# Patient Record
Sex: MALE | Race: WHITE | URBAN - METROPOLITAN AREA
[De-identification: names, ages, dates, MRNs, and addresses within clinical notes are randomized per-mention and may not be internally consistent; named-entity substitution may affect disease eponyms.]

---

## 2019-09-05 ENCOUNTER — OFFICE VISIT (OUTPATIENT)
Dept: INTERNAL MEDICINE | Facility: CLINIC | Age: 34
End: 2019-09-05
Payer: COMMERCIAL

## 2019-09-05 VITALS
HEART RATE: 66 BPM | OXYGEN SATURATION: 98 % | SYSTOLIC BLOOD PRESSURE: 120 MMHG | BODY MASS INDEX: 25.76 KG/M2 | TEMPERATURE: 98 F | HEIGHT: 71 IN | RESPIRATION RATE: 16 BRPM | WEIGHT: 184 LBS | DIASTOLIC BLOOD PRESSURE: 68 MMHG

## 2019-09-05 DIAGNOSIS — F90.9 ATTENTION DEFICIT HYPERACTIVITY DISORDER (ADHD), UNSPECIFIED ADHD TYPE: ICD-10-CM

## 2019-09-05 DIAGNOSIS — Z00.00 GENERAL MEDICAL EXAM: Primary | ICD-10-CM

## 2019-09-05 PROCEDURE — 99395 PREV VISIT EST AGE 18-39: CPT | Performed by: NURSE PRACTITIONER

## 2019-09-05 RX ORDER — METHYLPHENIDATE HYDROCHLORIDE 54 MG/1
54 TABLET ORAL DAILY
COMMUNITY
Start: 2011-08-05 | End: 2019-09-05 | Stop reason: SDUPTHER

## 2019-09-05 RX ORDER — METHYLPHENIDATE HYDROCHLORIDE 54 MG/1
54 TABLET ORAL DAILY
Qty: 90 TABLET | Refills: 0 | Status: SHIPPED | OUTPATIENT
Start: 2019-09-05 | End: 2019-10-11 | Stop reason: SDUPTHER

## 2019-09-05 ASSESSMENT — ENCOUNTER SYMPTOMS
BRUISES/BLEEDS EASILY: 0
HEADACHES: 0
PALPITATIONS: 0
SHORTNESS OF BREATH: 0
UNEXPECTED WEIGHT CHANGE: 0
ABDOMINAL DISTENTION: 0
DIFFICULTY URINATING: 0
DIZZINESS: 0
FATIGUE: 0
CHILLS: 0
SORE THROAT: 0
BACK PAIN: 0
ARTHRALGIAS: 0
COUGH: 0
FEVER: 0
DYSURIA: 0
ABDOMINAL PAIN: 0
APPETITE CHANGE: 0

## 2019-09-05 NOTE — PROGRESS NOTES
Subjective: Sharif Melvin (1985) is a 34 y.o. year old male       HPI    Pt comes in today for follow up    Had LCL surgery on left knee last summer, doing well    Just finished [employer] program and is looking for a new job, may relocate    Taking Concerta and vitamins D and C    No acute concerns today    He is interested in getting HPV vaccine, was not vaccinated at an adolescent.     Past office notes and lab work reviewed.    Patient Active Problem List    Diagnosis Date Noted   • General medical exam 09/05/2019   • ADHD 09/05/2019     History reviewed. No pertinent past medical history.  Past Surgical History:   Procedure Laterality Date   • MEDIAL COLLATERAL LIGAMENT AND LATERAL COLLATERAL LIGAMENT REPAIR, KNEE Right      Family History   Problem Relation Age of Onset   • Asthma Brother      Social History     Social History   • Marital status: Single     Spouse name: N/A   • Number of children: N/A   • Years of education: N/A     Social History Main Topics   • Smoking status: Never Smoker   • Smokeless tobacco: Never Used   • Alcohol use Yes      Comment: social use   • Drug use: Unknown   • Sexual activity: Not Asked     Other Topics Concern   • None     Social History Narrative   • None     No Known Allergies    Review of Systems   Constitutional: Negative for appetite change, chills, fatigue, fever and unexpected weight change.   HENT: Negative for congestion and sore throat.    Respiratory: Negative for cough and shortness of breath.    Cardiovascular: Negative for chest pain, palpitations and leg swelling.   Gastrointestinal: Negative for abdominal distention and abdominal pain.   Genitourinary: Negative for difficulty urinating and dysuria.   Musculoskeletal: Negative for arthralgias and back pain.   Neurological: Negative for dizziness and headaches.   Hematological: Does not bruise/bleed easily.         Objective:  Vitals:    09/05/19 0926   BP: 120/68   Pulse: 66   Resp: 16   Temp: 36.7 °C (98 °F)    SpO2: 98%     BP Readings from Last 3 Encounters:   09/05/19 120/68       Wt Readings from Last 3 Encounters:   09/05/19 83.5 kg (184 lb)     Body mass index is 25.66 kg/m².    Physical Exam   Constitutional: He is oriented to person, place, and time. He appears well-developed and well-nourished. No distress.   HENT:   Head: Normocephalic and atraumatic.   Mouth/Throat: Oropharynx is clear and moist. No oropharyngeal exudate.   Eyes: Pupils are equal, round, and reactive to light. Conjunctivae are normal. Right eye exhibits no discharge. Left eye exhibits no discharge. No scleral icterus.   Neck: Neck supple. No thyromegaly present.   Cardiovascular: Normal rate and regular rhythm.    Pulmonary/Chest: Effort normal and breath sounds normal. He has no wheezes.   Abdominal: Soft. Bowel sounds are normal. He exhibits no distension. There is no tenderness.   Musculoskeletal: He exhibits no edema or tenderness.   Lymphadenopathy:     He has no cervical adenopathy.   Neurological: He is alert and oriented to person, place, and time.   Skin: Skin is warm and dry. No rash noted.   Psychiatric: He has a normal mood and affect. His behavior is normal.   Vitals reviewed.        Most Recent Labs in Brief:  Lab Results   Component Value Date    BUN 18 04/18/2016     04/18/2016    CHOL 201 (H) 04/18/2016    CO2 22 04/18/2016    CREATININE 1.08 04/18/2016    HDL 66 04/18/2016    K 4.5 04/18/2016     04/18/2016    TRIG 74 04/18/2016         Assessment/Plan:     Problem List Items Addressed This Visit        Other    General medical exam - Primary    Current Assessment & Plan     Doing well. Will check basic labs, he wants to take lab slips with him. He will call his insurance company first to see if there is any cost associated with Gardasil vaccine. He will return to office for a flu shot next month.          Relevant Orders    CBC and Differential    Comprehensive metabolic panel    Lipid panel    ADHD    Current  Assessment & Plan     Has been taking Concerta for >10 years. Will send refill today.         Relevant Medications    methylphenidate HCl 54 mg ER tablet

## 2019-09-05 NOTE — ASSESSMENT & PLAN NOTE
Doing well. Will check basic labs, he wants to take lab slips with him. He will call his insurance company first to see if there is any cost associated with Gardasil vaccine. He will return to office for a flu shot next month.

## 2019-10-08 LAB
ALBUMIN SERPL-MCNC: 5 G/DL (ref 3.5–5.5)
ALBUMIN/GLOB SERPL: 2.2 {RATIO} (ref 1.2–2.2)
ALP SERPL-CCNC: 74 IU/L (ref 39–117)
ALT SERPL-CCNC: 27 IU/L (ref 0–44)
AST SERPL-CCNC: 26 IU/L (ref 0–40)
BASOPHILS # BLD AUTO: 0 X10E3/UL (ref 0–0.2)
BASOPHILS NFR BLD AUTO: 1 %
BILIRUB SERPL-MCNC: 0.3 MG/DL (ref 0–1.2)
BUN SERPL-MCNC: 17 MG/DL (ref 6–20)
BUN/CREAT SERPL: 17 (ref 9–20)
CALCIUM SERPL-MCNC: 9.8 MG/DL (ref 8.7–10.2)
CHLORIDE SERPL-SCNC: 100 MMOL/L (ref 96–106)
CHOLEST SERPL-MCNC: 226 MG/DL (ref 100–199)
CO2 SERPL-SCNC: 24 MMOL/L (ref 20–29)
CREAT SERPL-MCNC: 1.01 MG/DL (ref 0.76–1.27)
EOSINOPHIL # BLD AUTO: 0.1 X10E3/UL (ref 0–0.4)
EOSINOPHIL NFR BLD AUTO: 2 %
ERYTHROCYTE [DISTWIDTH] IN BLOOD BY AUTOMATED COUNT: 13.2 % (ref 12.3–15.4)
GLOBULIN SER CALC-MCNC: 2.3 G/DL (ref 1.5–4.5)
GLUCOSE SERPL-MCNC: 95 MG/DL (ref 65–99)
HCT VFR BLD AUTO: 46.4 % (ref 37.5–51)
HDLC SERPL-MCNC: 55 MG/DL
HGB BLD-MCNC: 15.5 G/DL (ref 13–17.7)
IMM GRANULOCYTES # BLD AUTO: 0 X10E3/UL (ref 0–0.1)
IMM GRANULOCYTES NFR BLD AUTO: 0 %
LAB CORP EGFR IF AFRICN AM: 111 ML/MIN/1.73
LAB CORP EGFR IF NONAFRICN AM: 96 ML/MIN/1.73
LDLC SERPL CALC-MCNC: 136 MG/DL (ref 0–99)
LYMPHOCYTES # BLD AUTO: 2.3 X10E3/UL (ref 0.7–3.1)
LYMPHOCYTES NFR BLD AUTO: 41 %
MCH RBC QN AUTO: 30.5 PG (ref 26.6–33)
MCHC RBC AUTO-ENTMCNC: 33.4 G/DL (ref 31.5–35.7)
MCV RBC AUTO: 91 FL (ref 79–97)
MONOCYTES # BLD AUTO: 0.6 X10E3/UL (ref 0.1–0.9)
MONOCYTES NFR BLD AUTO: 11 %
NEUTROPHILS # BLD AUTO: 2.5 X10E3/UL (ref 1.4–7)
NEUTROPHILS NFR BLD AUTO: 45 %
PLATELET # BLD AUTO: 277 X10E3/UL (ref 150–450)
POTASSIUM SERPL-SCNC: 4.8 MMOL/L (ref 3.5–5.2)
PROT SERPL-MCNC: 7.3 G/DL (ref 6–8.5)
RBC # BLD AUTO: 5.08 X10E6/UL (ref 4.14–5.8)
SODIUM SERPL-SCNC: 139 MMOL/L (ref 134–144)
TRIGL SERPL-MCNC: 174 MG/DL (ref 0–149)
VLDLC SERPL CALC-MCNC: 35 MG/DL (ref 5–40)
WBC # BLD AUTO: 5.5 X10E3/UL (ref 3.4–10.8)

## 2019-10-08 NOTE — PROGRESS NOTES
Can you please let pt know that his blood tests look stable? His cholesterol levels are a bit elevated, should increase physcial activity and watch diet. Thank you.

## 2019-11-15 RX ORDER — METHYLPHENIDATE HYDROCHLORIDE 54 MG/1
54 TABLET ORAL DAILY
Qty: 30 TABLET | Refills: 0 | Status: SHIPPED | OUTPATIENT
Start: 2019-11-15 | End: 2019-12-19 | Stop reason: SDUPTHER

## 2019-11-15 NOTE — TELEPHONE ENCOUNTER
Pt recently moved to [place]. He is out of medication. He is back in town fairly often so he wants to continue being a patient in our office. He wants to know if someone can send the concerta to the pharmacy in [place]

## 2019-12-18 ENCOUNTER — TELEPHONE (OUTPATIENT)
Dept: INTERNAL MEDICINE | Facility: CLINIC | Age: 34
End: 2019-12-18

## 2019-12-18 NOTE — TELEPHONE ENCOUNTER
Methylphenidate 54 mg - 1 tab daily.      Sent to dr. Schmidt because Dr. Barrett has left the office  for the day.

## 2019-12-19 ENCOUNTER — TELEPHONE (OUTPATIENT)
Dept: INTERNAL MEDICINE | Facility: CLINIC | Age: 34
End: 2019-12-19

## 2019-12-19 RX ORDER — METHYLPHENIDATE HYDROCHLORIDE 54 MG/1
54 TABLET ORAL DAILY
Qty: 14 TABLET | Refills: 0 | Status: SHIPPED | OUTPATIENT
Start: 2019-12-19 | End: 2019-12-30 | Stop reason: SDUPTHER

## 2019-12-19 NOTE — TELEPHONE ENCOUNTER
Patient is ut of the methylphenidate, he is requesting a few pills to carry him over until he is able to come in for an appointment. He is scheduled to see Katia on 12/30. Patient [place] out of [place] and has limited days when he is able to commute back to [place].

## 2019-12-19 NOTE — TELEPHONE ENCOUNTER
Please let the patient know that I would like him to come in for an appointment prior to refilling methylphenidate

## 2019-12-20 ENCOUNTER — TELEPHONE (OUTPATIENT)
Dept: INTERNAL MEDICINE | Facility: CLINIC | Age: 34
End: 2019-12-20

## 2019-12-20 NOTE — TELEPHONE ENCOUNTER
Per provider, I called the patient and spoke to him to let him know that a 14 day script was sent to his [facility].  The provider checked the PDMP and it was found that the patient has gotten this med in [place] where he now lives.    He has an appt with our NP on 12.30.19 and he was advised that the NP cannot prescribe out of state.  He is keeping the appt at the present time.    It was suggested he find a PCP in [place] since that is now where he is residing.

## 2019-12-23 ENCOUNTER — TELEPHONE (OUTPATIENT)
Dept: INTERNAL MEDICINE | Facility: CLINIC | Age: 34
End: 2019-12-23

## 2019-12-30 ENCOUNTER — OFFICE VISIT (OUTPATIENT)
Dept: INTERNAL MEDICINE | Facility: CLINIC | Age: 34
End: 2019-12-30
Payer: COMMERCIAL

## 2019-12-30 VITALS
SYSTOLIC BLOOD PRESSURE: 116 MMHG | HEART RATE: 80 BPM | RESPIRATION RATE: 16 BRPM | OXYGEN SATURATION: 97 % | BODY MASS INDEX: 25.76 KG/M2 | HEIGHT: 71 IN | DIASTOLIC BLOOD PRESSURE: 70 MMHG | WEIGHT: 184 LBS | TEMPERATURE: 98 F

## 2019-12-30 DIAGNOSIS — F90.9 ATTENTION DEFICIT HYPERACTIVITY DISORDER (ADHD), UNSPECIFIED ADHD TYPE: Primary | ICD-10-CM

## 2019-12-30 DIAGNOSIS — J06.9 VIRAL URI: ICD-10-CM

## 2019-12-30 PROCEDURE — 99214 OFFICE O/P EST MOD 30 MIN: CPT | Performed by: NURSE PRACTITIONER

## 2019-12-30 RX ORDER — METHYLPHENIDATE HYDROCHLORIDE 54 MG/1
54 TABLET ORAL DAILY
Qty: 30 TABLET | Refills: 0 | Status: SHIPPED | OUTPATIENT
Start: 2019-12-30 | End: 2020-02-04 | Stop reason: SDUPTHER

## 2019-12-30 RX ORDER — METHYLPHENIDATE HYDROCHLORIDE 54 MG/1
54 TABLET ORAL DAILY
Qty: 14 TABLET | Refills: 0 | Status: CANCELLED | OUTPATIENT
Start: 2019-12-30 | End: 2020-01-13

## 2019-12-30 ASSESSMENT — ENCOUNTER SYMPTOMS
CONSTITUTIONAL NEGATIVE: 1
COUGH: 1

## 2019-12-30 NOTE — ASSESSMENT & PLAN NOTE
See HPI- Patient has been on Concerta for over 10 years, diagnosed originally with Psych testing. Refilled 30 day supply today- PDMP queried. Advised he needs to either establish care here and have regular appointments for refills or establish with a new MD in [place]. He tells me his residence in [place] may not be permanent as he just moved for a new job, and is in [place] often working from home.   He understood limitations for refilling out of state.

## 2019-12-30 NOTE — PROGRESS NOTES
Subjective      Patient ID: Sharif Melvin is a 34 y.o. male.    Patient presents to office for medication refill and for URI symptoms x 1 week  Patient is a former patient of Dr. Sharif's he was seen in September of this year for a physical our NP  He has since moved to [place] in pursuit of a job, he tells me he has an apartment there but does not live there full time  He comes back and forth to his parents home in [place]  He has been in [place] on and off for about 2 months now, he is not sure if this job is going to be permanent  He is allowed to work from home a lot given the nature of the job    He tells me he as diagnosed with ADHD at age 19 by a psychiatrist after going through a series of tests  He has been taking Concerta for over 10 years at this point  He has not established with PCP or Psych in [place]- he has not followed with Psych in quite some time he tells me  He tells me he is stable on the medication   PDMP queried- he has recently picked up a 2 week supply in [place] and has been filling this prescription in [place] and [place]  I had an in depth conversation with him that if he is to continue being a patient at our practice NPs cannot RX any medication outside of the state [facility] where our prescriptive authority comes from  He tells me he intends to establish care with Dr. Raphael- however I emphasized that if his residence in [place] is to become more permanent he needs to establish with a PCP there- he verbalized understanding.   I discussed PDMP program and how these drugs are monitored since it is a controlled substance- he verbalized understanding  I did give him a one month refill today sent to a local pharmacy.     He tells me he has been coughing on and off and felt feverish a week ago- thin mucous not colored   He denies cp, sob, wheezing, bodyaches  Tells me his symptoms are vastly improving and he feels better  Has taken mucinex on and off       History reviewed. No pertinent past medical history.  Past Surgical  History:   Procedure Laterality Date   • MEDIAL COLLATERAL LIGAMENT AND LATERAL COLLATERAL LIGAMENT REPAIR, KNEE Right      Family History   Problem Relation Age of Onset   • Asthma Biological Brother      Social History     Socioeconomic History   • Marital status: Single     Spouse name: Not on file   • Number of children: Not on file   • Years of education: Not on file   • Highest education level: Not on file   Occupational History   • Not on file   Social Needs   • Financial resource strain: Not on file   • Food insecurity:     Worry: Not on file     Inability: Not on file   • Transportation needs:     Medical: Not on file     Non-medical: Not on file   Tobacco Use   • Smoking status: Never Smoker   • Smokeless tobacco: Never Used   Substance and Sexual Activity   • Alcohol use: Yes     Comment: social use   • Drug use: Not on file   • Sexual activity: Not on file   Lifestyle   • Physical activity:     Days per week: Not on file     Minutes per session: Not on file   • Stress: Not on file   Relationships   • Social connections:     Talks on phone: Not on file     Gets together: Not on file     Attends Gnosticism service: Not on file     Active member of club or organization: Not on file     Attends meetings of clubs or organizations: Not on file     Relationship status: Not on file   • Intimate partner violence:     Fear of current or ex partner: Not on file     Emotionally abused: Not on file     Physically abused: Not on file     Forced sexual activity: Not on file   Other Topics Concern   • Not on file   Social History Narrative   • Not on file     Patient has no known allergies.  Current Outpatient Medications   Medication Sig Dispense Refill   • methylphenidate HCl 54 mg ER tablet Take 54 mg by mouth daily. 30 tablet 0     No current facility-administered medications for this visit.        The following have been reviewed and updated as appropriate in this visit:  Problems       Review of Systems    Constitutional: Negative.    HENT: Negative.    Respiratory: Positive for cough.    All other systems reviewed and are negative.      Objective     Physical Exam   Constitutional: He is oriented to person, place, and time. He appears well-developed and well-nourished.   HENT:   Head: Normocephalic and atraumatic.   Right Ear: External ear normal.   Left Ear: External ear normal.   Nose: Nose normal.   Mouth/Throat: Oropharynx is clear and moist. No oropharyngeal exudate.   Eyes: Pupils are equal, round, and reactive to light. Conjunctivae are normal.   Neck: Normal range of motion. Neck supple.   Cardiovascular: Normal rate, regular rhythm, normal heart sounds and intact distal pulses. Exam reveals no gallop and no friction rub.   No murmur heard.  Pulmonary/Chest: Effort normal and breath sounds normal. No stridor. No respiratory distress. He has no wheezes. He has no rales.   Abdominal: Soft. Bowel sounds are normal. He exhibits no distension and no mass. There is no tenderness. There is no guarding.   Musculoskeletal: Normal range of motion.   Neurological: He is alert and oriented to person, place, and time.   Skin: Skin is warm and dry.   Psychiatric: He has a normal mood and affect.   Vitals reviewed.      Assessment/Plan   Problem List Items Addressed This Visit        Other    ADHD - Primary     See HPI- Patient has been on Concerta for over 10 years, diagnosed originally with Psych testing. Refilled 30 day supply today- PDMP queried. Advised he needs to either establish care here and have regular appointments for refills or establish with a new MD [place]. He tells me his residence in [place] may not be permanent as he just moved for a new job, and is in [place] often working from home.   He understood limitations for refilling out of state.         Relevant Medications    methylphenidate HCl 54 mg ER tablet      Other Visit Diagnoses     Viral URI        Resolving. Contine rest, fluids, mucinex as needed.

## 2020-02-04 RX ORDER — METHYLPHENIDATE HYDROCHLORIDE 54 MG/1
54 TABLET ORAL DAILY
Qty: 30 TABLET | Refills: 0 | Status: SHIPPED | OUTPATIENT
Start: 2020-02-04 | End: 2020-03-09 | Stop reason: SDUPTHER

## 2020-02-04 NOTE — TELEPHONE ENCOUNTER
From: Sharif Melvin  Sent: 2/4/2020 1:04 PM EST  Subject: Medication Renewal Request    Sharif Melvin would like a refill of the following medications:   Other - methylphenidate HCl 54 mg ER tablet     Preferred pharmacy: [facility] - [address]  Delivery method: Pickup

## 2020-03-09 RX ORDER — METHYLPHENIDATE HYDROCHLORIDE 54 MG/1
54 TABLET ORAL DAILY
Qty: 30 TABLET | Refills: 0 | Status: SHIPPED | OUTPATIENT
Start: 2020-03-09 | End: 2020-04-09 | Stop reason: SDUPTHER

## 2020-03-09 NOTE — TELEPHONE ENCOUNTER
From: Sharif Melvin  Sent: 3/9/2020 12:37 PM EDT  Subject: Medication Renewal Request    Sharif Melvin would like a refill of the following medications:   Other - Methylphenidate 54 mg refill request    Preferred pharmacy: [facility] - [address]  Delivery method: Pickup

## 2020-04-09 ENCOUNTER — TELEMEDICINE (OUTPATIENT)
Dept: PRIMARY CARE | Facility: CLINIC | Age: 35
End: 2020-04-09
Payer: COMMERCIAL

## 2020-04-09 DIAGNOSIS — Z80.42 FAMILY HISTORY OF PROSTATE CANCER: ICD-10-CM

## 2020-04-09 DIAGNOSIS — Z82.49 FAMILY HISTORY OF EARLY CAD: ICD-10-CM

## 2020-04-09 DIAGNOSIS — F90.9 ATTENTION DEFICIT HYPERACTIVITY DISORDER (ADHD), UNSPECIFIED ADHD TYPE: Primary | ICD-10-CM

## 2020-04-09 DIAGNOSIS — E78.5 HYPERLIPIDEMIA, UNSPECIFIED HYPERLIPIDEMIA TYPE: ICD-10-CM

## 2020-04-09 PROCEDURE — 99214 OFFICE O/P EST MOD 30 MIN: CPT | Mod: 95 | Performed by: INTERNAL MEDICINE

## 2020-04-09 RX ORDER — METHYLPHENIDATE HYDROCHLORIDE 54 MG/1
54 TABLET ORAL DAILY
Qty: 90 TABLET | Refills: 0 | Status: SHIPPED | OUTPATIENT
Start: 2020-04-09 | End: 2020-07-10 | Stop reason: SDUPTHER

## 2020-04-09 NOTE — PROGRESS NOTES
Request for Consent:   Video Encounter   Hello, my name is Donald Raphael MD.  Before we proceed, can you please verify your identification by telling me your full name and date of birth?  Can you tell me who is in the room with you?    You and I are about to have a telemedicine check-in or visit because you have requested it.  This is a live video-conference.  I am a real person, speaking to you in real time.  There is no one else with me on the video-conference.  However, when we use ([employer], etc) it is important for you to know that the video-conference may not be secure or private.  I am not recording this conversation and I am asking you not to record it.  This telemedicine visit will be billed to your health insurance or you, if you are self-insured.  You understand you will be responsible for any copayments or coinsurances that apply to your telemedicine visit.  Before starting our telemedicine visit, I am required to get your consent for this virtual check-in or visit by telemedicine. Do you consent?    Patient Response to Request for Consent:  Yes  Patient seen today through [employer] video chat    Visit Documentation:  Subjective     Patient ID: Sharif Melvin is a 35 y.o. male.  1985      HPI  The patient has been doing well overall ADHD, hyperlipidemia have been under good control.  He denies fever chills he denies nausea vomiting lightheadedness dizziness he denies chest pain he does find time to jog 3-4 times per week.  He denies painful urination frequent urination.  He denies constipation diarrhea.  He denies numbness tingling.  He denies weight loss and weight gain.  He denies abdominal pain chest pain.  The following have been reviewed and updated as appropriate in this visit:  Allergies  Meds  Problems       Review of Systems    Per HPI all the systems reviewed and negative  Assessment/Plan   1. Attention deficit hyperactivity disorder (ADHD), unspecified ADHD type  We  will continue the patient on methylphenidate which she has been on for about 10 years with positive results    2. Hyperlipidemia, unspecified hyperlipidemia type  The patient had a lipid profile done in October which showed a total cholesterol of 234 I let the patient know that cholesterol numbers are borderline he tells me that his grandfather did have heart attack at an early age in his 30s or 40s.  I let the patient know that we could consider CT calcium score to better risk stratify.  Patient will consider we will consider ordering the test after the COVID pandemic has abated    3. Family history of early CAD  As above I spoke to the patient about potential benefit of CT calcium score    4. Family history of prostate cancer  Patient's father was diagnosed in his mid 60s could consider PSA testing to start at age 40-45

## 2020-07-10 RX ORDER — METHYLPHENIDATE HYDROCHLORIDE 54 MG/1
54 TABLET ORAL DAILY
Qty: 90 TABLET | Refills: 0 | Status: SHIPPED | OUTPATIENT
Start: 2020-07-10 | End: 2020-10-09 | Stop reason: SDUPTHER

## 2020-07-10 NOTE — TELEPHONE ENCOUNTER
From: Sharif Melvin  Sent: 7/10/2020 2:18 PM EDT  Subject: Medication Renewal Request    Sharif Melvin would like a refill of the following medications:   Other - Concerta ER 54 mg (methylphenidate) 90 day supply    [id number]  Delivery method: Pickup

## 2020-10-09 NOTE — TELEPHONE ENCOUNTER
From: Sharif Melvin  To: Office of Donald Raphael MD  Sent: 10/9/2020 10:06 AM EDT  Subject: Medication Renewal Request    Refills have been requested for the following medications:   Other - Concerta ER 54 mg (methylphenidate) 90 day supply    Preferred pharmacy: [id number] [address]  Delivery method: Pickup

## 2020-10-12 RX ORDER — METHYLPHENIDATE HYDROCHLORIDE 54 MG/1
54 TABLET ORAL DAILY
Qty: 90 TABLET | Refills: 0 | Status: SHIPPED | OUTPATIENT
Start: 2020-10-12 | End: 2020-11-11

## 2021-01-08 ENCOUNTER — TELEPHONE (OUTPATIENT)
Dept: INTERNAL MEDICINE | Facility: CLINIC | Age: 36
End: 2021-01-08

## 2021-01-08 NOTE — TELEPHONE ENCOUNTER
From: Sharif Melvin  To: Office of Donald Raphael MD  Sent: 1/8/2021 3:16 PM EST  Subject: Medication Renewal Request    Refills have been requested for the following medications:   Other - Concerta ER 54 mg (methylphenidate) 90 day supply    Preferred pharmacy: [id number] [address]  Delivery method: Pickup

## 2021-01-08 NOTE — TELEPHONE ENCOUNTER
Patient is requesting - Concerta ER 54 mg (methylphenidate) 90 day supply. I do not see this on his med list.

## 2021-01-11 RX ORDER — METHYLPHENIDATE HYDROCHLORIDE 54 MG/1
54 TABLET ORAL DAILY
Qty: 30 TABLET | Refills: 0 | Status: SHIPPED | OUTPATIENT
Start: 2021-01-11 | End: 2021-02-09 | Stop reason: SDUPTHER

## 2021-02-09 RX ORDER — METHYLPHENIDATE HYDROCHLORIDE 54 MG/1
54 TABLET ORAL DAILY
Qty: 30 TABLET | Refills: 0 | Status: SHIPPED | OUTPATIENT
Start: 2021-02-09 | End: 2021-03-11

## 2021-03-12 RX ORDER — METHYLPHENIDATE HYDROCHLORIDE 54 MG/1
54 TABLET ORAL DAILY
Qty: 30 TABLET | Refills: 0 | Status: SHIPPED | OUTPATIENT
Start: 2021-03-12 | End: 2021-04-09 | Stop reason: SDUPTHER

## 2021-03-12 NOTE — TELEPHONE ENCOUNTER
From: Sharif Melvin  To: Office of Donald Raphael MD  Sent: 3/12/2021 8:46 AM EST  Subject: Medication Renewal Request    Refills have been requested for the following medications:   Other - methylphenidate HCl (CONCERTA) 54 mg ER tablet - 90 day script if possible    Preferred pharmacy: [facility] - [address]  Delivery method: Pickup

## 2021-04-09 RX ORDER — METHYLPHENIDATE HYDROCHLORIDE 54 MG/1
54 TABLET ORAL DAILY
Qty: 30 TABLET | Refills: 0 | Status: SHIPPED | OUTPATIENT
Start: 2021-04-09 | End: 2021-05-09

## 2021-04-26 ASSESSMENT — PATIENT HEALTH QUESTIONNAIRE - PHQ9: SUM OF ALL RESPONSES TO PHQ9 QUESTIONS 1 & 2: 0

## 2021-04-27 ENCOUNTER — OFFICE VISIT (OUTPATIENT)
Dept: INTERNAL MEDICINE | Facility: CLINIC | Age: 36
End: 2021-04-27
Payer: COMMERCIAL

## 2021-04-27 VITALS
HEART RATE: 75 BPM | SYSTOLIC BLOOD PRESSURE: 118 MMHG | RESPIRATION RATE: 18 BRPM | BODY MASS INDEX: 26.96 KG/M2 | WEIGHT: 193.2 LBS | OXYGEN SATURATION: 97 % | DIASTOLIC BLOOD PRESSURE: 86 MMHG

## 2021-04-27 DIAGNOSIS — Z00.00 ROUTINE ADULT HEALTH MAINTENANCE: ICD-10-CM

## 2021-04-27 DIAGNOSIS — F90.9 ATTENTION DEFICIT HYPERACTIVITY DISORDER (ADHD), UNSPECIFIED ADHD TYPE: ICD-10-CM

## 2021-04-27 DIAGNOSIS — R21 SKIN RASH: Primary | ICD-10-CM

## 2021-04-27 PROCEDURE — 99214 OFFICE O/P EST MOD 30 MIN: CPT | Performed by: INTERNAL MEDICINE

## 2021-04-27 PROCEDURE — 3008F BODY MASS INDEX DOCD: CPT | Performed by: INTERNAL MEDICINE

## 2021-04-27 RX ORDER — TRIAMCINOLONE ACETONIDE 1 MG/G
CREAM TOPICAL 2 TIMES DAILY PRN
Qty: 45 G | Refills: 3 | Status: SHIPPED | OUTPATIENT
Start: 2021-04-27 | End: 2021-04-27 | Stop reason: SDUPTHER

## 2021-04-27 RX ORDER — TRIAMCINOLONE ACETONIDE 1 MG/G
CREAM TOPICAL 2 TIMES DAILY PRN
Qty: 45 G | Refills: 3 | Status: SHIPPED | OUTPATIENT
Start: 2021-04-27 | End: 2024-12-09

## 2021-04-27 NOTE — PROGRESS NOTES
Chief Complaint:  Skin rash  History of present illness:  Patient's primary complaint today is a diffuse pruritic papular rash along the tops of his shoulders and back as well as his trunk he notes that the rash has been getting better over the past day or so he denies fever chills he is not aware of any exposure to new allergens that could explain the reaction he denies weight loss and weight gain he denies palpitations he denies constipation diarrhea he denies nausea and headache  Past medical history,active medical problems list, social history, family history, active medications, and allergies were all reviewed and updated as necessary today.    Review of systems-as stated in history of present illness all other systems reviewed and negative    Physical exam:  Visit Vitals  /86 (BP Location: Left upper arm, Patient Position: Sitting)   Pulse 75   Resp 18   Wt 87.6 kg (193 lb 3.2 oz)   SpO2 97%   BMI 26.96 kg/m²     Constitutional-well developed, no acute distress  Eyes-conjunctivae clear, anicteric,  Respiratory-inspection normal, auscultation clear bilaterally, effort normal  Cardiovascular-regular rate and rhythm.  No murmurs, gallops, or rubs.  Abdomen-inspection normal, auscultation normal, no abdominal tenderness, no hepatic enlargement, no splenic enlargement, no hernia  Musculoskeletal-visual overview of all 4 extremities is normal  Neurological-memory normal  Psychiatric-alert and responsive, appropriate mood and affect  Skin-the patient has scattered papular eruption on the chest and shoulders some excoriations noted  data:    Below is the patient's most recent value for Albumin, ALT, AST, BUN, Calcium, Chloride, Cholesterol, CO2, Creatinine, GFR, Glucose, HDL, Hematocrit, Hemoglobin, Hemoglobin A1C, LDL, Magnesium, Phosphorus, Platelets, Potassium, PSA, Sodium, Triglycerides, and WBC.   Lab Results   Component Value Date    ALBUMIN 5.0 10/07/2019    ALT 27 10/07/2019    AST 26 10/07/2019    BUN  17 10/07/2019     10/07/2019    CHOL 226 (H) 10/07/2019    CO2 24 10/07/2019    CREATININE 1.01 10/07/2019    HDL 55 10/07/2019    HCT 46.4 10/07/2019    HGB 15.5 10/07/2019     10/07/2019    K 4.8 10/07/2019     10/07/2019    TRIG 174 (H) 10/07/2019    WBC 5.5 10/07/2019     Note: for a comprehensive list of the patient's lab results, access the Results Review activity.  No results found for: TSH    Assessment and plan:  1. Skin rash  Most consistent with an urticarial rash I reviewed common allergens with the patient  - Lipid panel  - Comprehensive metabolic panel  - CBC and Differential  - TSH 3rd Generation  - Magnesium    2. Routine adult health maintenance    - Lipid panel  - Comprehensive metabolic panel  - CBC and Differential  - TSH 3rd Generation  - Magnesium    3. Attention deficit hyperactivity disorder (ADHD), unspecified ADHD type  Patient symptoms have been stable we will continue on Concerta at current dosage I will check a CMP CBC while on this medicine

## 2021-04-28 LAB
ALBUMIN SERPL-MCNC: 4.8 G/DL (ref 4–5)
ALBUMIN/GLOB SERPL: 1.8 {RATIO} (ref 1.2–2.2)
ALP SERPL-CCNC: 78 IU/L (ref 39–117)
ALT SERPL-CCNC: 47 IU/L (ref 0–44)
AST SERPL-CCNC: 34 IU/L (ref 0–40)
BASOPHILS # BLD AUTO: 0.1 X10E3/UL (ref 0–0.2)
BASOPHILS NFR BLD AUTO: 1 %
BILIRUB SERPL-MCNC: 0.3 MG/DL (ref 0–1.2)
BUN SERPL-MCNC: 14 MG/DL (ref 6–20)
BUN/CREAT SERPL: 14 (ref 9–20)
CALCIUM SERPL-MCNC: 9.8 MG/DL (ref 8.7–10.2)
CHLORIDE SERPL-SCNC: 103 MMOL/L (ref 96–106)
CHOLEST SERPL-MCNC: 240 MG/DL (ref 100–199)
CO2 SERPL-SCNC: 22 MMOL/L (ref 20–29)
CREAT SERPL-MCNC: 1.02 MG/DL (ref 0.76–1.27)
EOSINOPHIL # BLD AUTO: 0.1 X10E3/UL (ref 0–0.4)
EOSINOPHIL NFR BLD AUTO: 1 %
ERYTHROCYTE [DISTWIDTH] IN BLOOD BY AUTOMATED COUNT: 12.8 % (ref 11.6–15.4)
GLOBULIN SER CALC-MCNC: 2.7 G/DL (ref 1.5–4.5)
GLUCOSE SERPL-MCNC: 98 MG/DL (ref 65–99)
HCT VFR BLD AUTO: 46.3 % (ref 37.5–51)
HDLC SERPL-MCNC: 67 MG/DL
HGB BLD-MCNC: 15.5 G/DL (ref 13–17.7)
IMM GRANULOCYTES # BLD AUTO: 0 X10E3/UL (ref 0–0.1)
IMM GRANULOCYTES NFR BLD AUTO: 0 %
LAB CORP EGFR IF AFRICN AM: 108 ML/MIN/1.73
LAB CORP EGFR IF NONAFRICN AM: 93 ML/MIN/1.73
LDLC SERPL CALC-MCNC: 137 MG/DL (ref 0–99)
LYMPHOCYTES # BLD AUTO: 1.8 X10E3/UL (ref 0.7–3.1)
LYMPHOCYTES NFR BLD AUTO: 37 %
MAGNESIUM SERPL-MCNC: 2.1 MG/DL (ref 1.6–2.3)
MCH RBC QN AUTO: 30.5 PG (ref 26.6–33)
MCHC RBC AUTO-ENTMCNC: 33.5 G/DL (ref 31.5–35.7)
MCV RBC AUTO: 91 FL (ref 79–97)
MONOCYTES # BLD AUTO: 0.5 X10E3/UL (ref 0.1–0.9)
MONOCYTES NFR BLD AUTO: 10 %
NEUTROPHILS # BLD AUTO: 2.4 X10E3/UL (ref 1.4–7)
NEUTROPHILS NFR BLD AUTO: 51 %
PLATELET # BLD AUTO: 279 X10E3/UL (ref 150–450)
POTASSIUM SERPL-SCNC: 4.1 MMOL/L (ref 3.5–5.2)
PROT SERPL-MCNC: 7.5 G/DL (ref 6–8.5)
RBC # BLD AUTO: 5.08 X10E6/UL (ref 4.14–5.8)
SODIUM SERPL-SCNC: 137 MMOL/L (ref 134–144)
TRIGL SERPL-MCNC: 202 MG/DL (ref 0–149)
TSH SERPL DL<=0.005 MIU/L-ACNC: 1.97 UIU/ML (ref 0.45–4.5)
VLDLC SERPL CALC-MCNC: 36 MG/DL (ref 5–40)
WBC # BLD AUTO: 4.9 X10E3/UL (ref 3.4–10.8)

## 2021-05-11 RX ORDER — METHYLPHENIDATE HYDROCHLORIDE 54 MG/1
54 TABLET ORAL DAILY
Qty: 30 TABLET | Refills: 0 | Status: SHIPPED | OUTPATIENT
Start: 2021-05-11 | End: 2021-05-14 | Stop reason: SDUPTHER

## 2021-05-11 RX ORDER — METHYLPHENIDATE HYDROCHLORIDE 54 MG/1
54 TABLET ORAL DAILY
COMMUNITY
End: 2021-05-11 | Stop reason: SDUPTHER

## 2021-05-11 NOTE — TELEPHONE ENCOUNTER
Medicine Refill Request    Last Office Visit: 4/27/2021  Last Telemedicine Visit: Visit date not found    Next Office Visit: Visit date not found  Next Telemedicine Visit: Visit date not found         Current Outpatient Medications:   •  methylphenidate HCl 54 mg ER tablet, Take 54 mg by mouth daily., Disp: , Rfl:   •  triamcinolone (KENALOG) 0.1 % cream, Apply topically 2 (two) times a day as needed for rash., Disp: 45 g, Rfl: 3      BP Readings from Last 3 Encounters:   04/27/21 118/86   12/30/19 116/70   09/05/19 120/68       Recent Lab results:  Lab Results   Component Value Date    CHOL 240 (H) 04/27/2021   ,   Lab Results   Component Value Date    HDL 67 04/27/2021   ,   Lab Results   Component Value Date    LDLCALC 137 (H) 04/27/2021   ,   Lab Results   Component Value Date    TRIG 202 (H) 04/27/2021        Lab Results   Component Value Date    GLUCOSE 98 04/27/2021   , No results found for: HGBA1C      Lab Results   Component Value Date    CREATININE 1.02 04/27/2021       Lab Results   Component Value Date    TSH 1.970 04/27/2021

## 2021-06-11 RX ORDER — METHYLPHENIDATE HYDROCHLORIDE 54 MG/1
54 TABLET ORAL DAILY
Qty: 90 TABLET | Refills: 0 | Status: SHIPPED | OUTPATIENT
Start: 2021-06-11 | End: 2021-09-03 | Stop reason: SDUPTHER

## 2021-09-03 RX ORDER — METHYLPHENIDATE HYDROCHLORIDE 54 MG/1
54 TABLET ORAL DAILY
Qty: 90 TABLET | Refills: 0 | Status: SHIPPED | OUTPATIENT
Start: 2021-09-03 | End: 2021-09-09 | Stop reason: SDUPTHER

## 2021-09-03 NOTE — TELEPHONE ENCOUNTER
Medicine Refill Request    Last Office Visit: 4/27/2021  Last Telemedicine Visit: Visit date not found    Next Office Visit: Visit date not found  Next Telemedicine Visit: Visit date not found         Current Outpatient Medications:   •  methylphenidate HCl 54 mg ER tablet, Take 1 tablet (54 mg total) by mouth daily., Disp: 90 tablet, Rfl: 0  •  triamcinolone (KENALOG) 0.1 % cream, Apply topically 2 (two) times a day as needed for rash., Disp: 45 g, Rfl: 3      BP Readings from Last 3 Encounters:   04/27/21 118/86   12/30/19 116/70   09/05/19 120/68       Recent Lab results:  Lab Results   Component Value Date    CHOL 240 (H) 04/27/2021   ,   Lab Results   Component Value Date    HDL 67 04/27/2021   ,   Lab Results   Component Value Date    LDLCALC 137 (H) 04/27/2021   ,   Lab Results   Component Value Date    TRIG 202 (H) 04/27/2021        Lab Results   Component Value Date    GLUCOSE 98 04/27/2021   , No results found for: HGBA1C      Lab Results   Component Value Date    CREATININE 1.02 04/27/2021       Lab Results   Component Value Date    TSH 1.970 04/27/2021

## 2021-09-08 ENCOUNTER — TELEPHONE (OUTPATIENT)
Dept: INTERNAL MEDICINE | Facility: CLINIC | Age: 36
End: 2021-09-08

## 2021-09-08 NOTE — TELEPHONE ENCOUNTER
Pt is going out of town and  Wanted to get his medication fill early   the pharm has the script but need permission to fill early  Please advise    concerta Er 54   it is a  Narcotic      [facility]- [phone] from [facility]

## 2021-09-09 RX ORDER — METHYLPHENIDATE HYDROCHLORIDE 54 MG/1
54 TABLET ORAL DAILY
Qty: 90 TABLET | Refills: 0 | Status: SHIPPED | OUTPATIENT
Start: 2021-09-09 | End: 2021-12-09 | Stop reason: SDUPTHER

## 2021-09-10 NOTE — TELEPHONE ENCOUNTER
Called pt inform him that the order was completed    Called the pharm and I  Was on hold for 15 min.   had to hang up.      pt is aware

## 2021-12-09 RX ORDER — METHYLPHENIDATE HYDROCHLORIDE 54 MG/1
54 TABLET ORAL DAILY
Qty: 90 TABLET | Refills: 0 | Status: SHIPPED | OUTPATIENT
Start: 2021-12-09 | End: 2022-03-10 | Stop reason: SDUPTHER

## 2022-02-15 ENCOUNTER — TELEPHONE (OUTPATIENT)
Dept: INTERNAL MEDICINE | Facility: CLINIC | Age: 37
End: 2022-02-15
Payer: COMMERCIAL

## 2022-02-15 NOTE — TELEPHONE ENCOUNTER
Pt called to say that upon waking up this morning he was unable to express language in the first 3 min.   pt said he think he had a transient ischemic attack- per pt.     Spoke to Dr. Ijeoma Raphael told me to tell pt to go to the ER ASAP.      Dr. Raphael aware

## 2022-03-10 RX ORDER — METHYLPHENIDATE HYDROCHLORIDE 54 MG/1
54 TABLET ORAL DAILY
Qty: 90 TABLET | Refills: 0 | Status: SHIPPED | OUTPATIENT
Start: 2022-03-10 | End: 2022-06-09 | Stop reason: SDUPTHER

## 2022-03-10 NOTE — TELEPHONE ENCOUNTER
Medicine Refill Request    Last Office: 4/27/2021   Last Consult Visit: Visit date not found  Last Telemedicine Visit: Visit date not found    Next Appointment: Visit date not found      Current Outpatient Medications:   •  methylphenidate HCl 54 mg ER tablet, Take 1 tablet (54 mg total) by mouth daily., Disp: 90 tablet, Rfl: 0  •  triamcinolone (KENALOG) 0.1 % cream, Apply topically 2 (two) times a day as needed for rash., Disp: 45 g, Rfl: 3      BP Readings from Last 3 Encounters:   04/27/21 118/86   12/30/19 116/70   09/05/19 120/68       Recent Lab results:  Lab Results   Component Value Date    CHOL 240 (H) 04/27/2021   ,   Lab Results   Component Value Date    HDL 67 04/27/2021   ,   Lab Results   Component Value Date    LDLCALC 137 (H) 04/27/2021   ,   Lab Results   Component Value Date    TRIG 202 (H) 04/27/2021        Lab Results   Component Value Date    GLUCOSE 98 04/27/2021   , No results found for: HGBA1C      Lab Results   Component Value Date    CREATININE 1.02 04/27/2021       Lab Results   Component Value Date    TSH 1.970 04/27/2021

## 2022-06-09 RX ORDER — METHYLPHENIDATE HYDROCHLORIDE 54 MG/1
54 TABLET ORAL DAILY
Qty: 90 TABLET | Refills: 0 | Status: SHIPPED | OUTPATIENT
Start: 2022-06-09 | End: 2022-09-12 | Stop reason: SDUPTHER

## 2022-09-12 RX ORDER — METHYLPHENIDATE HYDROCHLORIDE 54 MG/1
54 TABLET ORAL DAILY
Qty: 90 TABLET | Refills: 0 | Status: SHIPPED | OUTPATIENT
Start: 2022-09-12 | End: 2022-12-12 | Stop reason: SDUPTHER

## 2022-09-12 NOTE — TELEPHONE ENCOUNTER
PT has scheduled appointment on 9-19-22 with Christina.     Can RX be refilled so he does not run out prior to his appointment?  
No

## 2022-09-27 ENCOUNTER — APPOINTMENT (RX ONLY)
Dept: URBAN - METROPOLITAN AREA CLINIC 41 | Facility: CLINIC | Age: 37
Setting detail: DERMATOLOGY
End: 2022-09-27

## 2022-09-27 DIAGNOSIS — L85.3 XEROSIS CUTIS: ICD-10-CM | Status: STABLE

## 2022-09-27 DIAGNOSIS — B07.8 OTHER VIRAL WARTS: ICD-10-CM | Status: INADEQUATELY CONTROLLED

## 2022-09-27 DIAGNOSIS — L82.1 OTHER SEBORRHEIC KERATOSIS: ICD-10-CM | Status: STABLE

## 2022-09-27 DIAGNOSIS — L72.0 EPIDERMAL CYST: ICD-10-CM | Status: INADEQUATELY CONTROLLED

## 2022-09-27 DIAGNOSIS — D22 MELANOCYTIC NEVI: ICD-10-CM | Status: STABLE

## 2022-09-27 DIAGNOSIS — D18.0 HEMANGIOMA: ICD-10-CM | Status: STABLE

## 2022-09-27 DIAGNOSIS — L57.8 OTHER SKIN CHANGES DUE TO CHRONIC EXPOSURE TO NONIONIZING RADIATION: ICD-10-CM | Status: STABLE

## 2022-09-27 PROBLEM — D18.01 HEMANGIOMA OF SKIN AND SUBCUTANEOUS TISSUE: Status: ACTIVE | Noted: 2022-09-27

## 2022-09-27 PROBLEM — D22.5 MELANOCYTIC NEVI OF TRUNK: Status: ACTIVE | Noted: 2022-09-27

## 2022-09-27 PROCEDURE — ? LIQUID NITROGEN

## 2022-09-27 PROCEDURE — ? SURGICAL DECISION MAKING

## 2022-09-27 PROCEDURE — ? FULL BODY SKIN EXAM

## 2022-09-27 PROCEDURE — ? TREATMENT REGIMEN

## 2022-09-27 PROCEDURE — 99204 OFFICE O/P NEW MOD 45 MIN: CPT | Mod: 25

## 2022-09-27 PROCEDURE — 17110 DESTRUCTION B9 LES UP TO 14: CPT

## 2022-09-27 PROCEDURE — ? DEFER

## 2022-09-27 PROCEDURE — ? COUNSELING

## 2022-09-27 PROCEDURE — ? ADDITIONAL NOTES

## 2022-09-27 ASSESSMENT — LOCATION DETAILED DESCRIPTION DERM
LOCATION DETAILED: RIGHT PROXIMAL DORSAL FOREARM
LOCATION DETAILED: LEFT INFERIOR MEDIAL MIDBACK
LOCATION DETAILED: LEFT POSTERIOR SHOULDER
LOCATION DETAILED: INFERIOR MID FOREHEAD
LOCATION DETAILED: RIGHT POSTERIOR SHOULDER
LOCATION DETAILED: INFERIOR THORACIC SPINE
LOCATION DETAILED: LEFT CHIN
LOCATION DETAILED: RIGHT PROXIMAL PRETIBIAL REGION
LOCATION DETAILED: LEFT DISTAL PRETIBIAL REGION

## 2022-09-27 ASSESSMENT — LOCATION SIMPLE DESCRIPTION DERM
LOCATION SIMPLE: RIGHT FOREARM
LOCATION SIMPLE: UPPER BACK
LOCATION SIMPLE: LEFT PRETIBIAL REGION
LOCATION SIMPLE: LEFT LOWER BACK
LOCATION SIMPLE: INFERIOR FOREHEAD
LOCATION SIMPLE: RIGHT PRETIBIAL REGION
LOCATION SIMPLE: RIGHT SHOULDER
LOCATION SIMPLE: CHIN
LOCATION SIMPLE: LEFT SHOULDER

## 2022-09-27 ASSESSMENT — LOCATION ZONE DERM
LOCATION ZONE: LEG
LOCATION ZONE: ARM
LOCATION ZONE: TRUNK
LOCATION ZONE: FACE

## 2022-09-27 NOTE — PROCEDURE: LIQUID NITROGEN
Render Note In Bullet Format When Appropriate: No
Detail Level: Detailed
Spray Paint Text: The liquid nitrogen was applied to the skin utilizing a spray paint frosting technique.
Post-Care Instructions: I reviewed with the patient in detail post-care instructions. Patient is to wear sunprotection, and avoid picking at any of the treated lesions. Pt may apply Vaseline to crusted or scabbing areas.
Show Spray Paint Technique Variable?: Yes
Medical Necessity Information: It is in your best interest to select a reason for this procedure from the list below. All of these items fulfill various CMS LCD requirements except the new and changing color options.
Consent: The patient's consent was obtained including but not limited to risks of crusting, scabbing, blistering, scarring, darker or lighter pigmentary change, recurrence, incomplete removal and infection.
Medical Necessity Clause: This procedure was medically necessary because the lesions that were treated were:

## 2022-09-27 NOTE — HPI: EVALUATION OF SKIN LESION(S)
Hpi Title: Evaluation of Skin Lesions
Additional History: New pt, first fbse in many years, Pt denies skin cancer history. Spots of concern on right forearm and on chin.

## 2022-09-27 NOTE — PROCEDURE: SURGICAL DECISION MAKING
Identified Risk Factors Documented?: yes
Complexity (Necessary For Coding; Major - 90 Day Global With Some Exceptions; Minor - 10 Day Global): minor
Discussion: Risks, benefits and alternatives to treatment discussed at length with patient today.
Risk Assessment Explanation (Does Not Render In The Note): Clinical determination of the probability and/or consequences of an event, such as surgery. Clinical assessment of the level of risk is affected by the nature of the event under consideration for the patient. Modifier 57 is used to indicate an Evaluation and Management (E/M) service resulted in the initial decision to perform surgery either the day before a major surgery (90 day global) or the day of a major surgery.

## 2022-12-12 ENCOUNTER — OFFICE VISIT (OUTPATIENT)
Dept: INTERNAL MEDICINE | Facility: CLINIC | Age: 37
End: 2022-12-12
Payer: COMMERCIAL

## 2022-12-12 VITALS
HEART RATE: 74 BPM | OXYGEN SATURATION: 97 % | BODY MASS INDEX: 27.58 KG/M2 | RESPIRATION RATE: 16 BRPM | SYSTOLIC BLOOD PRESSURE: 124 MMHG | HEIGHT: 71 IN | WEIGHT: 197 LBS | DIASTOLIC BLOOD PRESSURE: 70 MMHG

## 2022-12-12 DIAGNOSIS — Z00.00 GENERAL MEDICAL EXAM: Primary | ICD-10-CM

## 2022-12-12 DIAGNOSIS — Z23 NEED FOR IMMUNIZATION AGAINST INFLUENZA: ICD-10-CM

## 2022-12-12 DIAGNOSIS — F90.9 ATTENTION DEFICIT HYPERACTIVITY DISORDER (ADHD), UNSPECIFIED ADHD TYPE: ICD-10-CM

## 2022-12-12 PROCEDURE — 99395 PREV VISIT EST AGE 18-39: CPT | Mod: 25 | Performed by: NURSE PRACTITIONER

## 2022-12-12 PROCEDURE — 3008F BODY MASS INDEX DOCD: CPT | Performed by: NURSE PRACTITIONER

## 2022-12-12 PROCEDURE — 90471 IMMUNIZATION ADMIN: CPT | Performed by: NURSE PRACTITIONER

## 2022-12-12 PROCEDURE — 90686 IIV4 VACC NO PRSV 0.5 ML IM: CPT | Performed by: NURSE PRACTITIONER

## 2022-12-12 RX ORDER — METHYLPHENIDATE HYDROCHLORIDE 54 MG/1
54 TABLET ORAL DAILY
Qty: 90 TABLET | Refills: 0 | Status: SHIPPED | OUTPATIENT
Start: 2022-12-12 | End: 2022-12-12 | Stop reason: SDUPTHER

## 2022-12-12 RX ORDER — METHYLPHENIDATE HYDROCHLORIDE 54 MG/1
54 TABLET ORAL DAILY
Qty: 90 TABLET | Refills: 0 | Status: CANCELLED | OUTPATIENT
Start: 2022-12-12

## 2022-12-12 ASSESSMENT — ENCOUNTER SYMPTOMS
DIAPHORESIS: 0
SINUS PAIN: 0
FEVER: 0
POLYDIPSIA: 0
SHORTNESS OF BREATH: 0
WEAKNESS: 0
BACK PAIN: 0
PALPITATIONS: 0
LIGHT-HEADEDNESS: 0
DIFFICULTY URINATING: 0
WOUND: 0
NAUSEA: 0
CHILLS: 0
HEADACHES: 0
ABDOMINAL PAIN: 0
JOINT SWELLING: 0
WHEEZING: 0
EYE DISCHARGE: 0
COUGH: 0
DIZZINESS: 0
APPETITE CHANGE: 0
CONSTIPATION: 0
DYSURIA: 0
FATIGUE: 0
SORE THROAT: 0
MYALGIAS: 0
VOMITING: 0

## 2022-12-12 ASSESSMENT — PATIENT HEALTH QUESTIONNAIRE - PHQ9: SUM OF ALL RESPONSES TO PHQ9 QUESTIONS 1 & 2: 0

## 2022-12-12 NOTE — PROGRESS NOTES
Subjective: Sharif Melvin (1985) is a 37 y.o. year old male       HPI    Pt comes in today for routine physical    No recent illnesses     [place] in [place] back to this area for medical care  Engaged, getting  in May    Went to ER last February 2022 with transient aphasia. Followed up with neuro in June. No further recurrence.    ADHD stable on concerta    Needs flu shot    Also interested in HPV vaccine        Patient Active Problem List    Diagnosis Date Noted   • General medical exam 09/05/2019   • ADHD 09/05/2019     History reviewed. No pertinent past medical history.  Past Surgical History:   Procedure Laterality Date   • MEDIAL COLLATERAL LIGAMENT AND LATERAL COLLATERAL LIGAMENT REPAIR, KNEE Right      Family History   Problem Relation Age of Onset   • Asthma Biological Brother      Social History     Socioeconomic History   • Marital status: Single     Spouse name: None   • Number of children: None   • Years of education: None   • Highest education level: None   Tobacco Use   • Smoking status: Never   • Smokeless tobacco: Never   Substance and Sexual Activity   • Alcohol use: Yes     Comment: social use     Immunization History   Administered Date(s) Administered   • Influenza (IM) Preservative Free 10/02/2020   • Influenza Vaccine Quadrivalent 3 Yr And Older 10/23/2021      No Known Allergies    Review of Systems   Constitutional: Negative for appetite change, chills, diaphoresis, fatigue and fever.   HENT: Negative for congestion, ear pain, sinus pain and sore throat.    Eyes: Negative for discharge and visual disturbance.   Respiratory: Negative for cough, shortness of breath and wheezing.    Cardiovascular: Negative for chest pain, palpitations and leg swelling.   Gastrointestinal: Negative for abdominal pain, constipation, nausea and vomiting.   Endocrine: Negative for polydipsia and polyuria.   Genitourinary: Negative for difficulty urinating and dysuria.   Musculoskeletal: Negative  for back pain, joint swelling and myalgias.   Skin: Negative for rash and wound.   Neurological: Negative for dizziness, weakness, light-headedness and headaches.         Objective:  Vitals:    12/12/22 1117   BP: 124/70   Pulse: 74   Resp: 16   SpO2: 97%     BP Readings from Last 3 Encounters:   12/12/22 124/70   04/27/21 118/86   12/30/19 116/70       Wt Readings from Last 10 Encounters:   12/12/22 89.4 kg (197 lb)   04/27/21 87.6 kg (193 lb 3.2 oz)   12/30/19 83.5 kg (184 lb)   09/05/19 83.5 kg (184 lb)     Body mass index is 27.48 kg/m².    Physical Exam  Vitals reviewed.   Constitutional:       General: He is not in acute distress.     Appearance: Normal appearance. He is well-developed.   HENT:      Head: Normocephalic and atraumatic.      Right Ear: Tympanic membrane and ear canal normal.      Left Ear: Tympanic membrane and ear canal normal.      Mouth/Throat:      Mouth: Mucous membranes are moist.      Pharynx: Oropharynx is clear. No oropharyngeal exudate.   Eyes:      General: No scleral icterus.     Conjunctiva/sclera: Conjunctivae normal.      Pupils: Pupils are equal, round, and reactive to light.   Cardiovascular:      Rate and Rhythm: Normal rate and regular rhythm.      Heart sounds: Normal heart sounds.   Pulmonary:      Effort: Pulmonary effort is normal. No respiratory distress.      Breath sounds: Normal breath sounds. No wheezing.   Abdominal:      General: Bowel sounds are normal. There is no distension.      Palpations: Abdomen is soft.      Tenderness: There is no abdominal tenderness.   Musculoskeletal:         General: No tenderness. Normal range of motion.      Cervical back: Normal range of motion and neck supple. No muscular tenderness.   Lymphadenopathy:      Cervical: No cervical adenopathy.   Skin:     General: Skin is warm and dry.      Findings: No rash.   Neurological:      General: No focal deficit present.      Mental Status: He is alert and oriented to person, place, and time.    Psychiatric:         Mood and Affect: Mood normal.         Behavior: Behavior normal.           Assessment/Plan:     Problem List Items Addressed This Visit        Mental Health    ADHD    Current Assessment & Plan     Stable. Refill sent for concerta. I also ordered UDS.          Relevant Medications    methylphenidate HCl 54 mg ER tablet    Other Relevant Orders    Drug screen panel, urine       Other    General medical exam - Primary    Current Assessment & Plan     Pt presents for a physical today  I will order routine fasting blood work  Admin flu shot  He will go to pharmacy for HPV vaccine series         Relevant Orders    CBC and Differential    Comprehensive metabolic panel    TSH w reflex FT4    Lipid panel   Other Visit Diagnoses     Need for immunization against influenza        Relevant Orders    Influenza vaccine quadrivalent preservative free 6 month and older IM

## 2022-12-12 NOTE — ASSESSMENT & PLAN NOTE
Pt presents for a physical today  I will order routine fasting blood work  Admin flu shot  He will go to pharmacy for HPV vaccine series

## 2022-12-12 NOTE — TELEPHONE ENCOUNTER
Called and informed patient. He will do research on which Pharmacies have the medication in stock within state [place] and I rec he send a MPM to  so this would be addressed sometime tomorrow. He is aware Christina will not be in tomorrow.

## 2022-12-14 RX ORDER — METHYLPHENIDATE HYDROCHLORIDE 54 MG/1
54 TABLET ORAL DAILY
Qty: 90 TABLET | Refills: 0 | Status: SHIPPED | OUTPATIENT
Start: 2022-12-14 | End: 2023-03-14 | Stop reason: SDUPTHER

## 2023-03-15 RX ORDER — METHYLPHENIDATE HYDROCHLORIDE 54 MG/1
54 TABLET ORAL DAILY
Qty: 90 TABLET | Refills: 0 | Status: SHIPPED | OUTPATIENT
Start: 2023-03-15 | End: 2023-06-13 | Stop reason: SDUPTHER

## 2023-03-20 LAB
BASOPHILS # BLD AUTO: 0.1 X10E3/UL (ref 0–0.2)
BASOPHILS NFR BLD AUTO: 1 %
EOSINOPHIL # BLD AUTO: 0.2 X10E3/UL (ref 0–0.4)
EOSINOPHIL NFR BLD AUTO: 3 %
ERYTHROCYTE [DISTWIDTH] IN BLOOD BY AUTOMATED COUNT: 12.3 % (ref 11.6–15.4)
HCT VFR BLD AUTO: 46.6 % (ref 37.5–51)
HGB BLD-MCNC: 15.6 G/DL (ref 13–17.7)
IMM GRANULOCYTES # BLD AUTO: 0 X10E3/UL (ref 0–0.1)
IMM GRANULOCYTES NFR BLD AUTO: 0 %
LYMPHOCYTES # BLD AUTO: 2.3 X10E3/UL (ref 0.7–3.1)
LYMPHOCYTES NFR BLD AUTO: 37 %
MCH RBC QN AUTO: 30.5 PG (ref 26.6–33)
MCHC RBC AUTO-ENTMCNC: 33.5 G/DL (ref 31.5–35.7)
MCV RBC AUTO: 91 FL (ref 79–97)
MONOCYTES # BLD AUTO: 0.7 X10E3/UL (ref 0.1–0.9)
MONOCYTES NFR BLD AUTO: 11 %
NEUTROPHILS # BLD AUTO: 3.1 X10E3/UL (ref 1.4–7)
NEUTROPHILS NFR BLD AUTO: 48 %
PLATELET # BLD AUTO: 283 X10E3/UL (ref 150–450)
RBC # BLD AUTO: 5.11 X10E6/UL (ref 4.14–5.8)
WBC # BLD AUTO: 6.4 X10E3/UL (ref 3.4–10.8)

## 2023-03-21 LAB
ALBUMIN SERPL-MCNC: 4.8 G/DL (ref 4–5)
ALBUMIN/GLOB SERPL: 2 {RATIO} (ref 1.2–2.2)
ALP SERPL-CCNC: 83 IU/L (ref 44–121)
ALT SERPL-CCNC: 29 IU/L (ref 0–44)
AMPHETAMINES UR QL SCN: NEGATIVE NG/ML
AST SERPL-CCNC: 26 IU/L (ref 0–40)
BARBITURATES UR QL SCN: NEGATIVE NG/ML
BENZODIAZ UR QL: NEGATIVE NG/ML
BILIRUB SERPL-MCNC: 0.5 MG/DL (ref 0–1.2)
BUN SERPL-MCNC: 13 MG/DL (ref 6–20)
BUN/CREAT SERPL: 11 (ref 9–20)
BZE UR QL: NEGATIVE NG/ML
CALCIUM SERPL-MCNC: 9.7 MG/DL (ref 8.7–10.2)
CANNABINOIDS UR QL SCN: NEGATIVE NG/ML
CHLORIDE SERPL-SCNC: 101 MMOL/L (ref 96–106)
CHOLEST SERPL-MCNC: 232 MG/DL (ref 100–199)
CO2 SERPL-SCNC: 22 MMOL/L (ref 20–29)
CREAT SERPL-MCNC: 1.15 MG/DL (ref 0.76–1.27)
EGFRCR SERPLBLD CKD-EPI 2021: 83 ML/MIN/1.73
GLOBULIN SER CALC-MCNC: 2.4 G/DL (ref 1.5–4.5)
GLUCOSE SERPL-MCNC: 103 MG/DL (ref 70–99)
HDLC SERPL-MCNC: 57 MG/DL
LDLC SERPL CALC-MCNC: 151 MG/DL (ref 0–99)
OPIATES UR QL: NEGATIVE NG/ML
PCP UR QL: NEGATIVE NG/ML
POTASSIUM SERPL-SCNC: 4.5 MMOL/L (ref 3.5–5.2)
PROT SERPL-MCNC: 7.2 G/DL (ref 6–8.5)
SODIUM SERPL-SCNC: 138 MMOL/L (ref 134–144)
T4 FREE SERPL-MCNC: 1.16 NG/DL (ref 0.82–1.77)
TRIGL SERPL-MCNC: 136 MG/DL (ref 0–149)
TSH SERPL DL<=0.005 MIU/L-ACNC: 1.57 UIU/ML (ref 0.45–4.5)
VLDLC SERPL CALC-MCNC: 24 MG/DL (ref 5–40)

## 2023-06-14 RX ORDER — METHYLPHENIDATE HYDROCHLORIDE 54 MG/1
54 TABLET ORAL DAILY
Qty: 90 TABLET | Refills: 0 | Status: SHIPPED | OUTPATIENT
Start: 2023-06-14 | End: 2023-09-08 | Stop reason: SDUPTHER

## 2023-09-11 RX ORDER — METHYLPHENIDATE HYDROCHLORIDE 54 MG/1
54 TABLET ORAL DAILY
Qty: 90 TABLET | Refills: 0 | Status: SHIPPED | OUTPATIENT
Start: 2023-09-11 | End: 2023-12-05 | Stop reason: SDUPTHER

## 2023-09-11 NOTE — TELEPHONE ENCOUNTER
Requested Prescriptions     Pending Prescriptions Disp Refills    methylphenidate HCl 54 mg ER tablet 90 tablet 0     Sig: Take 1 tablet (54 mg total) by mouth daily.     Medicine Refill Request    Last Office Visit: 12/12/2022   Last Consult Visit: Visit date not found  Last Telemedicine Visit: Visit date not found    Next Appointment: Visit date not found      Current Outpatient Medications:     methylphenidate HCl 54 mg ER tablet, Take 1 tablet (54 mg total) by mouth daily., Disp: 90 tablet, Rfl: 0    triamcinolone (KENALOG) 0.1 % cream, Apply topically 2 (two) times a day as needed for rash., Disp: 45 g, Rfl: 3      BP Readings from Last 3 Encounters:   12/12/22 124/70   04/27/21 118/86   12/30/19 116/70       Recent Lab results:  Lab Results   Component Value Date    CHOL 232 (H) 03/20/2023   ,   Lab Results   Component Value Date    HDL 57 03/20/2023   ,   Lab Results   Component Value Date    LDLCALC 151 (H) 03/20/2023   ,   Lab Results   Component Value Date    TRIG 136 03/20/2023        Lab Results   Component Value Date    GLUCOSE 103 (H) 03/20/2023   , No results found for: HGBA1C      Lab Results   Component Value Date    CREATININE 1.15 03/20/2023       Lab Results   Component Value Date    TSH 1.570 03/20/2023         No results found for: HGBA1C

## 2023-12-05 RX ORDER — METHYLPHENIDATE HYDROCHLORIDE 54 MG/1
54 TABLET ORAL DAILY
Qty: 90 TABLET | Refills: 0 | Status: SHIPPED | OUTPATIENT
Start: 2023-12-05 | End: 2024-03-05 | Stop reason: SDUPTHER

## 2023-12-05 NOTE — TELEPHONE ENCOUNTER
"Requested Prescriptions     Pending Prescriptions Disp Refills   • methylphenidate HCl 54 mg ER tablet 90 tablet 0     Sig: Take 1 tablet (54 mg total) by mouth daily.     Medicine Refill Request    Last Office Visit: 12/12/2022   Last Consult Visit: Visit date not found  Last Telemedicine Visit: Visit date not found    Next Appointment: Visit date not found      Current Outpatient Medications:   •  methylphenidate HCl 54 mg ER tablet, Take 1 tablet (54 mg total) by mouth daily., Disp: 90 tablet, Rfl: 0  •  triamcinolone (KENALOG) 0.1 % cream, Apply topically 2 (two) times a day as needed for rash., Disp: 45 g, Rfl: 3      BP Readings from Last 3 Encounters:   12/12/22 124/70   04/27/21 118/86   12/30/19 116/70       Recent Lab results:  Lab Results   Component Value Date    CHOL 232 (H) 03/20/2023   ,   Lab Results   Component Value Date    HDL 57 03/20/2023   ,   Lab Results   Component Value Date    LDLCALC 151 (H) 03/20/2023   ,   Lab Results   Component Value Date    TRIG 136 03/20/2023        Lab Results   Component Value Date    GLUCOSE 103 (H) 03/20/2023   , No results found for: \"HGBA1C\"      Lab Results   Component Value Date    CREATININE 1.15 03/20/2023       Lab Results   Component Value Date    TSH 1.570 03/20/2023         No results found for: \"HGBA1C\"  "

## 2024-03-05 RX ORDER — METHYLPHENIDATE HYDROCHLORIDE 54 MG/1
54 TABLET ORAL DAILY
Qty: 90 TABLET | Refills: 0 | Status: SHIPPED | OUTPATIENT
Start: 2024-03-05 | End: 2024-06-06 | Stop reason: SDUPTHER

## 2024-03-05 NOTE — TELEPHONE ENCOUNTER
"Medicine Refill Request    Last Office Visit: 12/12/2022   Last Consult Visit: Visit date not found  Last Telemedicine Visit: Visit date not found    Next Appointment: Visit date not found      Current Outpatient Medications:   •  methylphenidate HCl 54 mg ER tablet, Take 1 tablet (54 mg total) by mouth daily., Disp: 90 tablet, Rfl: 0  •  triamcinolone (KENALOG) 0.1 % cream, Apply topically 2 (two) times a day as needed for rash., Disp: 45 g, Rfl: 3      BP Readings from Last 3 Encounters:   12/12/22 124/70   04/27/21 118/86   12/30/19 116/70       Recent Lab results:  Lab Results   Component Value Date    CHOL 232 (H) 03/20/2023   ,   Lab Results   Component Value Date    HDL 57 03/20/2023   ,   Lab Results   Component Value Date    LDLCALC 151 (H) 03/20/2023   ,   Lab Results   Component Value Date    TRIG 136 03/20/2023        Lab Results   Component Value Date    GLUCOSE 103 (H) 03/20/2023   , No results found for: \"HGBA1C\"      Lab Results   Component Value Date    CREATININE 1.15 03/20/2023       Lab Results   Component Value Date    TSH 1.570 03/20/2023         No results found for: \"HGBA1C\"  "

## 2024-06-06 RX ORDER — METHYLPHENIDATE HYDROCHLORIDE 54 MG/1
54 TABLET ORAL DAILY
Qty: 90 TABLET | Refills: 0 | Status: SHIPPED | OUTPATIENT
Start: 2024-06-06 | End: 2024-09-03 | Stop reason: SDUPTHER

## 2024-09-04 RX ORDER — METHYLPHENIDATE HYDROCHLORIDE 54 MG/1
54 TABLET ORAL DAILY
Qty: 90 TABLET | Refills: 0 | Status: SHIPPED | OUTPATIENT
Start: 2024-09-04 | End: 2024-12-04 | Stop reason: SDUPTHER

## 2024-09-04 NOTE — TELEPHONE ENCOUNTER
"Medicine Refill Request    Last Office Visit: 12/12/2022   Last Consult Visit: Visit date not found  Last Telemedicine Visit: Visit date not found    Next Appointment: Visit date not found      Current Outpatient Medications:     methylphenidate HCl 54 mg ER tablet, Take 1 tablet (54 mg total) by mouth daily., Disp: 90 tablet, Rfl: 0    triamcinolone (KENALOG) 0.1 % cream, Apply topically 2 (two) times a day as needed for rash., Disp: 45 g, Rfl: 3      BP Readings from Last 3 Encounters:   12/12/22 124/70   04/27/21 118/86   12/30/19 116/70       Recent Lab results:  Lab Results   Component Value Date    CHOL 232 (H) 03/20/2023   ,   Lab Results   Component Value Date    HDL 57 03/20/2023   ,   Lab Results   Component Value Date    LDLCALC 151 (H) 03/20/2023   ,   Lab Results   Component Value Date    TRIG 136 03/20/2023        Lab Results   Component Value Date    GLUCOSE 103 (H) 03/20/2023   , No results found for: \"HGBA1C\"      Lab Results   Component Value Date    CREATININE 1.15 03/20/2023       Lab Results   Component Value Date    TSH 1.570 03/20/2023         No results found for: \"HGBA1C\"  "

## 2024-12-05 RX ORDER — METHYLPHENIDATE HYDROCHLORIDE 54 MG/1
54 TABLET ORAL DAILY
Qty: 90 TABLET | Refills: 0 | Status: SHIPPED | OUTPATIENT
Start: 2024-12-05 | End: 2025-03-14 | Stop reason: SDUPTHER

## 2024-12-05 NOTE — TELEPHONE ENCOUNTER
"Medicine Refill Request    Last Office Visit: 12/12/2022   Last Consult Visit: Visit date not found  Last Telemedicine Visit: Visit date not found    Next Appointment: 3/11/2025      Current Outpatient Medications:     methylphenidate HCl 54 mg ER tablet, Take 1 tablet (54 mg total) by mouth daily., Disp: 90 tablet, Rfl: 0    triamcinolone (KENALOG) 0.1 % cream, Apply topically 2 (two) times a day as needed for rash., Disp: 45 g, Rfl: 3      BP Readings from Last 3 Encounters:   12/12/22 124/70   04/27/21 118/86   12/30/19 116/70       Recent Lab results:  Lab Results   Component Value Date    CHOL 232 (H) 03/20/2023   ,   Lab Results   Component Value Date    HDL 57 03/20/2023   ,   Lab Results   Component Value Date    LDLCALC 151 (H) 03/20/2023   ,   Lab Results   Component Value Date    TRIG 136 03/20/2023        Lab Results   Component Value Date    GLUCOSE 103 (H) 03/20/2023   , No results found for: \"HGBA1C\"      Lab Results   Component Value Date    CREATININE 1.15 03/20/2023       Lab Results   Component Value Date    TSH 1.570 03/20/2023         No results found for: \"HGBA1C\"  "

## 2024-12-09 ENCOUNTER — OFFICE VISIT (OUTPATIENT)
Dept: INTERNAL MEDICINE | Facility: CLINIC | Age: 39
End: 2024-12-09
Payer: COMMERCIAL

## 2024-12-09 VITALS
HEART RATE: 81 BPM | DIASTOLIC BLOOD PRESSURE: 80 MMHG | BODY MASS INDEX: 28.98 KG/M2 | RESPIRATION RATE: 18 BRPM | OXYGEN SATURATION: 98 % | SYSTOLIC BLOOD PRESSURE: 130 MMHG | HEIGHT: 71 IN | WEIGHT: 207 LBS

## 2024-12-09 DIAGNOSIS — Z11.59 NEED FOR HEPATITIS C SCREENING TEST: ICD-10-CM

## 2024-12-09 DIAGNOSIS — Z00.00 GENERAL MEDICAL EXAM: ICD-10-CM

## 2024-12-09 DIAGNOSIS — M79.671 RIGHT FOOT PAIN: ICD-10-CM

## 2024-12-09 DIAGNOSIS — F90.2 ATTENTION DEFICIT HYPERACTIVITY DISORDER (ADHD), COMBINED TYPE: ICD-10-CM

## 2024-12-09 DIAGNOSIS — Z11.4 SCREENING FOR HIV (HUMAN IMMUNODEFICIENCY VIRUS): Primary | ICD-10-CM

## 2024-12-09 PROCEDURE — 99396 PREV VISIT EST AGE 40-64: CPT | Performed by: INTERNAL MEDICINE

## 2024-12-09 PROCEDURE — 3008F BODY MASS INDEX DOCD: CPT | Performed by: INTERNAL MEDICINE

## 2024-12-09 ASSESSMENT — PATIENT HEALTH QUESTIONNAIRE - PHQ9: SUM OF ALL RESPONSES TO PHQ9 QUESTIONS 1 & 2: 0

## 2024-12-09 NOTE — PROGRESS NOTES
"Patient ID: Sharif Melvin, : 1985 is a 39 y.o. male who presents for Follow-up    Consent obtained from patient and all parties present in the room? yes  Attestation    Subjective     History of Present Illness  The patient presents for a routine checkup.    He reports no new health issues and is here for a general checkup. He expresses a desire to ensure his vaccinations are up-to-date, particularly as he and his wife are expecting their first child in 2025. He mentions the need for a Tdap vaccine and inquires about the necessity of an HPV vaccine.    He has been experiencing a steady weight gain over the past few years. His exercise routine, including running a few days a week and using a rowing machine. He reports no bowel irregularities such as constipation or diarrhea and satisfactory sleep. He also reports no discomfort during urination. He has recently started using a \"[employer]\" ramona to track his health metrics.    He has developed a bunion on his right foot, which causes him discomfort and is slightly bowed out compared to his left foot. This does not interfere with his daily activities but can be mildly annoying at times. He is unsure if this is related to a previous ligament repair on his right knee a few years ago.    IMMUNIZATIONS  He had influenza vaccine in 10/2024.    The following have been reviewed and updated as appropriate in this visit:  Past medical history, Social History, Family History, Medications, Allergies     Physical exam:  Visit Vitals  /80 (BP Location: Left upper arm, Patient Position: Sitting)   Pulse 81   Resp 18   Ht 1.803 m (5' 11\")   Wt 93.9 kg (207 lb)   SpO2 98%   BMI 28.87 kg/m²     Physical Exam  General Appearance: Normal.  HEENT-tympanic membranes normal    Neck exam normal  Respiratory: Lungs are clear bilaterally.  Cardiovascular: Heart rhythm is regular. No murmurs detected.  Skin: Warm and dry, no rash.  Has a mild bunion deformity noted on the right foot " "no redness or skin breakdown  Wt Readings from Last 10 Encounters:   12/09/24 93.9 kg (207 lb)   12/12/22 89.4 kg (197 lb)   04/27/21 87.6 kg (193 lb 3.2 oz)   12/30/19 83.5 kg (184 lb)   09/05/19 83.5 kg (184 lb)       BP Readings from Last 3 Encounters:   12/09/24 130/80   12/12/22 124/70   04/27/21 118/86      data:    Below is the patient's most recent value for Albumin, ALT, AST, BUN, Calcium, Chloride, Cholesterol, CO2, Creatinine, GFR, Glucose, HDL, Hematocrit, Hemoglobin, Hemoglobin A1C, LDL, Magnesium, Phosphorus, Platelets, Potassium, PSA, Sodium, Triglycerides, and WBC.   Lab Results   Component Value Date    ALBUMIN 4.8 03/20/2023    ALT 29 03/20/2023    AST 26 03/20/2023    BUN 13 03/20/2023     03/20/2023    CHOL 232 (H) 03/20/2023    CO2 22 03/20/2023    CREATININE 1.15 03/20/2023    HDL 57 03/20/2023    HCT 46.6 03/20/2023    HGB 15.6 03/20/2023    MG 2.1 04/27/2021     03/20/2023    K 4.5 03/20/2023     03/20/2023    TRIG 136 03/20/2023    WBC 6.4 03/20/2023       Lab Results   Component Value Date    TSH 1.570 03/20/2023       Lab Results   Component Value Date    LDLCALC 151 (H) 03/20/2023    LDLCALC 137 (H) 04/27/2021    CREATININE 1.15 03/20/2023    CREATININE 1.02 04/27/2021    CREATININE 1.01 10/07/2019    CREATININE 1.08 04/18/2016      No results found for: \"URICACID\", \"[id number]\", \"VITD25\"   Results      Assessment & Plan  1. Routine checkup - Blood pressure is borderline at 130/80, BMI is 28 (overweight), and HPV vaccine is not necessary for his age group.  -I have recommended Tdap  - Conduct lab test to determine hepatitis B vaccination status; recommend vaccine if not received  - Monitor blood pressure weekly or monthly, aiming for readings below 130/80  - Suggest weight loss and reduce sodium intake to 2000 mg per day  - Encourage maintaining a low sodium diet and monitoring weight to minimize cardiovascular risk  - Order fasting blood work to assess blood sugar and " cholesterol levels  - Consider CT calcium score if cholesterol levels are borderline  - Advise incorporating strength training into exercise routine  - Continue methylphenidate regimen    2. Right foot bunion - Likely due to degenerative changes, possibly exacerbated by running mechanics.  - Referral to sports podiatrist for further evaluation and management    3. Health Maintenance - Ensure up-to-date vaccinations.  - Administer Tdap vaccine today  - Conduct lab test to determine hepatitis B vaccination status; recommend vaccine if not received  4.  Adult ADHD will continue methylphenidate and monitor CBC and CMP  Follow-up  - Return in 1 year or sooner if necessary

## 2025-03-14 RX ORDER — METHYLPHENIDATE HYDROCHLORIDE 54 MG/1
54 TABLET ORAL DAILY
Qty: 90 TABLET | Refills: 0 | Status: SHIPPED | OUTPATIENT
Start: 2025-03-14

## 2025-06-16 RX ORDER — METHYLPHENIDATE HYDROCHLORIDE 54 MG/1
54 TABLET ORAL DAILY
Qty: 90 TABLET | Refills: 0 | Status: SHIPPED | OUTPATIENT
Start: 2025-06-16

## 2025-06-16 NOTE — TELEPHONE ENCOUNTER
"Medicine Refill Request    Last Office Visit: 12/9/2024   Last Consult Visit: Visit date not found  Last Telemedicine Visit: Visit date not found    Next Appointment: 12/15/2025      Current Outpatient Medications:     methylphenidate HCl 54 mg ER tablet, Take 1 tablet (54 mg total) by mouth daily., Disp: 90 tablet, Rfl: 0    BP Readings from Last 3 Encounters:   12/09/24 130/80   12/12/22 124/70   04/27/21 118/86       Recent Lab results:  Lab Results   Component Value Date    CHOL 232 (H) 03/20/2023   ,   Lab Results   Component Value Date    HDL 57 03/20/2023   ,   Lab Results   Component Value Date    LDLCALC 151 (H) 03/20/2023   ,   Lab Results   Component Value Date    TRIG 136 03/20/2023        Lab Results   Component Value Date    GLUCOSE 103 (H) 03/20/2023   , No results found for: \"HGBA1C\"      Lab Results   Component Value Date    CREATININE 1.15 03/20/2023       Lab Results   Component Value Date    TSH 1.570 03/20/2023         No results found for: \"HGBA1C\"  "

## 2025-06-19 ENCOUNTER — TELEPHONE (OUTPATIENT)
Dept: INTERNAL MEDICINE | Facility: CLINIC | Age: 40
End: 2025-06-19
Payer: COMMERCIAL

## 2025-06-19 NOTE — TELEPHONE ENCOUNTER
(1) IN: 2025 02:36PM MAT   [address]   Name  TG BROWN   Tele # [phone]     [birth year]   Msg  RX REFILL STATUS. PHARM ASKED YOU        TO CALL TO CONFIRM DETAILS BEFORE        THEY CAN FILL IT.[facility] [phone]

## 2025-06-20 NOTE — TELEPHONE ENCOUNTER
RN spoke with pharmacist, confirming his address, due to being seen in our office in [place] and his address on file is in [place]. They have not contacted patient to update his address, they have a new system. Medication ordered to [place] pharmacy.